# Patient Record
(demographics unavailable — no encounter records)

---

## 2024-11-25 NOTE — HISTORY OF PRESENT ILLNESS
[de-identified] : 49M presents for follow-up of HLD. Has been tolerating atorvastatin and due for bloodwork.

## 2024-11-25 NOTE — ASSESSMENT
[FreeTextEntry1] : HLD: Check CMP, lipids. Continue atorvastatin 20mg daily and will adjust as necessary.